# Patient Record
Sex: MALE | Race: WHITE | NOT HISPANIC OR LATINO | Employment: FULL TIME | URBAN - METROPOLITAN AREA
[De-identification: names, ages, dates, MRNs, and addresses within clinical notes are randomized per-mention and may not be internally consistent; named-entity substitution may affect disease eponyms.]

---

## 2022-03-04 ENCOUNTER — OFFICE VISIT (OUTPATIENT)
Dept: FAMILY MEDICINE | Facility: CLINIC | Age: 32
End: 2022-03-04
Payer: COMMERCIAL

## 2022-03-04 VITALS
HEART RATE: 77 BPM | WEIGHT: 221 LBS | DIASTOLIC BLOOD PRESSURE: 78 MMHG | BODY MASS INDEX: 32.73 KG/M2 | SYSTOLIC BLOOD PRESSURE: 130 MMHG | HEIGHT: 69 IN | OXYGEN SATURATION: 98 % | RESPIRATION RATE: 20 BRPM

## 2022-03-04 DIAGNOSIS — G62.9 NEUROPATHY: ICD-10-CM

## 2022-03-04 DIAGNOSIS — F39 MOOD DISORDER: ICD-10-CM

## 2022-03-04 DIAGNOSIS — Z76.89 ENCOUNTER TO ESTABLISH CARE WITH NEW DOCTOR: Primary | ICD-10-CM

## 2022-03-04 DIAGNOSIS — R45.4 DIFFICULTY CONTROLLING ANGER: ICD-10-CM

## 2022-03-04 PROCEDURE — 3008F PR BODY MASS INDEX (BMI) DOCUMENTED: ICD-10-PCS | Mod: CPTII,S$GLB,, | Performed by: NURSE PRACTITIONER

## 2022-03-04 PROCEDURE — 3075F SYST BP GE 130 - 139MM HG: CPT | Mod: CPTII,S$GLB,, | Performed by: NURSE PRACTITIONER

## 2022-03-04 PROCEDURE — 1159F MED LIST DOCD IN RCRD: CPT | Mod: CPTII,S$GLB,, | Performed by: NURSE PRACTITIONER

## 2022-03-04 PROCEDURE — 99204 PR OFFICE/OUTPT VISIT, NEW, LEVL IV, 45-59 MIN: ICD-10-PCS | Mod: S$GLB,,, | Performed by: NURSE PRACTITIONER

## 2022-03-04 PROCEDURE — 1159F PR MEDICATION LIST DOCUMENTED IN MEDICAL RECORD: ICD-10-PCS | Mod: CPTII,S$GLB,, | Performed by: NURSE PRACTITIONER

## 2022-03-04 PROCEDURE — 3078F PR MOST RECENT DIASTOLIC BLOOD PRESSURE < 80 MM HG: ICD-10-PCS | Mod: CPTII,S$GLB,, | Performed by: NURSE PRACTITIONER

## 2022-03-04 PROCEDURE — 3078F DIAST BP <80 MM HG: CPT | Mod: CPTII,S$GLB,, | Performed by: NURSE PRACTITIONER

## 2022-03-04 PROCEDURE — 3008F BODY MASS INDEX DOCD: CPT | Mod: CPTII,S$GLB,, | Performed by: NURSE PRACTITIONER

## 2022-03-04 PROCEDURE — 99204 OFFICE O/P NEW MOD 45 MIN: CPT | Mod: S$GLB,,, | Performed by: NURSE PRACTITIONER

## 2022-03-04 PROCEDURE — 3075F PR MOST RECENT SYSTOLIC BLOOD PRESS GE 130-139MM HG: ICD-10-PCS | Mod: CPTII,S$GLB,, | Performed by: NURSE PRACTITIONER

## 2022-03-04 RX ORDER — MELOXICAM 15 MG/1
15 TABLET ORAL DAILY
COMMUNITY

## 2022-03-04 RX ORDER — TIZANIDINE HYDROCHLORIDE 4 MG/1
CAPSULE, GELATIN COATED ORAL
COMMUNITY

## 2022-03-04 RX ORDER — CARIPRAZINE 1.5 MG/1
1.5 CAPSULE, GELATIN COATED ORAL DAILY
Qty: 30 CAPSULE | Refills: 0
Start: 2022-03-04 | End: 2022-03-18 | Stop reason: SDUPTHER

## 2022-03-04 RX ORDER — ARIPIPRAZOLE 2 MG/1
5 TABLET ORAL DAILY
COMMUNITY
End: 2022-03-18

## 2022-03-04 NOTE — PROGRESS NOTES
"Subjective:      Patient ID: Dustin Garcia is a 31 y.o. male.    Chief Complaint: Establish Care (Prior PCP = TEX GARCIA. Possible carpal tunnel: wrist px, hands go numb)      31-year-old male      Patient is here today to establish primary care      He is currently on Abilify for "ADHD and anger".  He has been off the medication for 2-3 days. Has been on the med for a total of 1 month.  This is given to him by his prior PCP primarily for anger and mood disorder.  He states his PCP told him that he was "probably ADHD as a child and it either goes one or two ways.... good or bad.... as an adult."  He has difficulty controlling his anger with co workers.  He drinks daily. Some days 1-2 drinks. Other days he may drink in excess. Has a family history of alcoholism. He is accompanied by his wife. He denies SI, HI, delusion, grandiosity. + irritability, anxiety. No FOi. Has taken escitalopram and Prozac. He had significant side effects. Abilify "somewhat does what I need it to but sometimes I dont feel like its working."       No other issues reported.     Past Medical History:   Diagnosis Date    Arthritis of back     Mood disorder       Social History     Socioeconomic History    Marital status:    Tobacco Use    Smoking status: Current Every Day Smoker     Packs/day: 1.00     Years: 12.00     Pack years: 12.00     Types: Cigarettes    Smokeless tobacco: Never Used   Substance and Sexual Activity    Alcohol use: Yes    Drug use: Never      Family History   Problem Relation Age of Onset    Diabetes Mother     Cancer Mother     No Known Problems Father     No Known Problems Sister     No Known Problems Brother     No Known Problems Maternal Grandmother     No Known Problems Maternal Grandfather     No Known Problems Paternal Grandmother     No Known Problems Paternal Grandfather         ROS:   Review of Systems   Constitutional: Negative for appetite change, chills and fever.   Eyes: Negative for visual " disturbance.   Respiratory: Negative for cough, chest tightness, shortness of breath and wheezing.    Cardiovascular: Negative for chest pain.   Gastrointestinal: Negative for abdominal pain, diarrhea, nausea and vomiting.   Endocrine: Negative for polydipsia, polyphagia and polyuria.   Genitourinary: Negative for difficulty urinating, dysuria, flank pain and hematuria.   Musculoskeletal: Negative for back pain and neck pain.   Skin: Negative for rash.   Neurological: Negative for dizziness, tremors and headaches.   Hematological: Negative for adenopathy. Does not bruise/bleed easily.   Psychiatric/Behavioral: Positive for agitation. Negative for behavioral problems, confusion, decreased concentration, dysphoric mood, hallucinations, self-injury, sleep disturbance and suicidal ideas. The patient is nervous/anxious. The patient is not hyperactive.      Objective:   Physical Exam  Vitals and nursing note reviewed.   Constitutional:       Appearance: He is well-developed.   Eyes:      General: No scleral icterus.     Pupils: Pupils are equal, round, and reactive to light.   Cardiovascular:      Rate and Rhythm: Normal rate and regular rhythm.   Pulmonary:      Effort: Pulmonary effort is normal.      Breath sounds: Normal breath sounds.   Chest:   Breasts:      Right: No supraclavicular adenopathy.      Left: No supraclavicular adenopathy.       Abdominal:      General: Bowel sounds are normal.      Palpations: Abdomen is soft.   Musculoskeletal:         General: Normal range of motion.      Right elbow: Tenderness present in lateral epicondyle.      Left elbow: Tenderness present in lateral epicondyle.      Cervical back: Normal range of motion and neck supple.   Lymphadenopathy:      Cervical: No cervical adenopathy.      Upper Body:      Right upper body: No supraclavicular adenopathy.      Left upper body: No supraclavicular adenopathy.   Skin:     General: Skin is warm and dry.      Capillary Refill: Capillary  refill takes less than 2 seconds.   Neurological:      Mental Status: He is alert and oriented to person, place, and time.   Psychiatric:         Attention and Perception: Attention and perception normal.         Mood and Affect: Affect normal. Mood is anxious. Mood is not depressed. Affect is not angry or inappropriate.         Speech: Speech normal.         Behavior: Behavior normal. Behavior is cooperative.         Thought Content: Thought content normal.         Cognition and Memory: Cognition and memory normal.         Judgment: Judgment normal.       Assessment:     1. Encounter to establish care with new doctor    2. Difficulty controlling anger    3. Mood disorder    4. Neuropathy      No images are attached to the encounter.   Plan:     Problem List Items Addressed This Visit        Psychiatric    Mood disorder    Current Assessment & Plan     Took Lexapro, prozac, and abilify. Failed lexapro and prozac miserably. Had significant side effects.     He has been on Abilify for the past month and states that its somewhat controlling his mood, but he feels like at times it doesn't help. He has been off the medication for 3-4 days.       I gave him 21 days of Vraylar 1.5 mg. Side effects of the medication were discussed with the patient.  Instructed to notify me if akasthesia  develops and I would call out cogentin. Also informed to RTC immediately if s/s worsened. At that time, I would start him back on Abilify.     RTC in 2 weeks for repeat evaluation.            Relevant Medications    cariprazine (VRAYLAR) 1.5 mg Cap      Other Visit Diagnoses     Encounter to establish care with new doctor    -  Primary    Difficulty controlling anger        Relevant Medications    cariprazine (VRAYLAR) 1.5 mg Cap    Neuropathy        WIll get EMG. If no carpal tunnel, will inject his epicondyles.     Relevant Orders    EMG W/ ULTRASOUND AND NERVE CONDUCTION TEST 2 Extremities        Procedures     No results found for any  previous visit.        No results found in the last 30 days.     All diagnostic data (labs/imaging) was reviewed with the patient and/or family member in the room.  All questions were answered to their liking. The patient and/or family member voiced understanding of all instructions provided. Expectations regarding follow up and treatment plan were voiced and confirmed prior to departure. The patient was given orders/instructions at the end of the visit for reference. They were instructed to notify my office if they have not been contacted for imaging/referrals/labs/results in 1-2 weeks. They voiced understanding of all of the above.     Follow up:     There are no Patient Instructions on file for this visit.     Follow up in about 2 weeks (around 3/18/2022), or if symptoms worsen or fail to improve.

## 2022-03-04 NOTE — ASSESSMENT & PLAN NOTE
Took Lexapro, prozac, and abilify. Failed lexapro and prozac miserably. Had significant side effects.     He has been on Abilify for the past month and states that its somewhat controlling his mood, but he feels like at times it doesn't help. He has been off the medication for 3-4 days.       I gave him 21 days of Vraylar 1.5 mg. Side effects of the medication were discussed with the patient.  Instructed to notify me if akasthesia  develops and I would call out cogentin. Also informed to RTC immediately if s/s worsened. At that time, I would start him back on Abilify.     RTC in 2 weeks for repeat evaluation.

## 2022-03-18 ENCOUNTER — OFFICE VISIT (OUTPATIENT)
Dept: FAMILY MEDICINE | Facility: CLINIC | Age: 32
End: 2022-03-18
Payer: COMMERCIAL

## 2022-03-18 VITALS
OXYGEN SATURATION: 98 % | HEART RATE: 85 BPM | DIASTOLIC BLOOD PRESSURE: 84 MMHG | SYSTOLIC BLOOD PRESSURE: 130 MMHG | RESPIRATION RATE: 18 BRPM

## 2022-03-18 DIAGNOSIS — F39 MOOD DISORDER: Primary | ICD-10-CM

## 2022-03-18 DIAGNOSIS — R45.4 DIFFICULTY CONTROLLING ANGER: ICD-10-CM

## 2022-03-18 PROCEDURE — 3075F PR MOST RECENT SYSTOLIC BLOOD PRESS GE 130-139MM HG: ICD-10-PCS | Mod: CPTII,S$GLB,, | Performed by: NURSE PRACTITIONER

## 2022-03-18 PROCEDURE — 3079F PR MOST RECENT DIASTOLIC BLOOD PRESSURE 80-89 MM HG: ICD-10-PCS | Mod: CPTII,S$GLB,, | Performed by: NURSE PRACTITIONER

## 2022-03-18 PROCEDURE — 99213 PR OFFICE/OUTPT VISIT, EST, LEVL III, 20-29 MIN: ICD-10-PCS | Mod: S$GLB,,, | Performed by: NURSE PRACTITIONER

## 2022-03-18 PROCEDURE — 3079F DIAST BP 80-89 MM HG: CPT | Mod: CPTII,S$GLB,, | Performed by: NURSE PRACTITIONER

## 2022-03-18 PROCEDURE — 99213 OFFICE O/P EST LOW 20 MIN: CPT | Mod: S$GLB,,, | Performed by: NURSE PRACTITIONER

## 2022-03-18 PROCEDURE — 1159F PR MEDICATION LIST DOCUMENTED IN MEDICAL RECORD: ICD-10-PCS | Mod: CPTII,S$GLB,, | Performed by: NURSE PRACTITIONER

## 2022-03-18 PROCEDURE — 3075F SYST BP GE 130 - 139MM HG: CPT | Mod: CPTII,S$GLB,, | Performed by: NURSE PRACTITIONER

## 2022-03-18 PROCEDURE — 1159F MED LIST DOCD IN RCRD: CPT | Mod: CPTII,S$GLB,, | Performed by: NURSE PRACTITIONER

## 2022-03-18 RX ORDER — CARIPRAZINE 1.5 MG/1
1.5 CAPSULE, GELATIN COATED ORAL DAILY
Qty: 30 CAPSULE | Refills: 11 | Status: SHIPPED | OUTPATIENT
Start: 2022-03-18

## 2022-03-18 NOTE — PROGRESS NOTES
"Subjective:      Patient ID: Dustin Garcia is a 31 y.o. male.    Chief Complaint: Follow-up      31-year-old male    PMH bipolar disorder.    Patient is here today for 2 week f/u       During his last encounter, he was given Vraylar 1.5 mg for mood disorder.  He was previously on Abilify for "ADHD and anger".   He had been on the med for a total of 1 month, but felt like it was not working for him.  This is given to him by his prior PCP primarily for anger and mood disorder.  He states his PCP told him that he was "probably ADHD as a child and it either goes one or two ways.... good or bad.... as an adult."  Anger and outburst are better controlled on vraylar. Only side effect is fatigue.   He denies SI, HI, delusion, grandiosity. Denies irritability, anxiety. No FOi. Has taken escitalopram and Prozac. He had significant side effects. Abilify "somewhat does what I need it to but sometimes I dont feel like its working."       No other issues reported.     Past Medical History:   Diagnosis Date    Arthritis of back     Mood disorder       Social History     Socioeconomic History    Marital status:    Tobacco Use    Smoking status: Current Every Day Smoker     Packs/day: 1.00     Years: 12.00     Pack years: 12.00     Types: Cigarettes    Smokeless tobacco: Never Used   Substance and Sexual Activity    Alcohol use: Yes    Drug use: Never      Family History   Problem Relation Age of Onset    Diabetes Mother     Cancer Mother     No Known Problems Father     No Known Problems Sister     No Known Problems Brother     No Known Problems Maternal Grandmother     No Known Problems Maternal Grandfather     No Known Problems Paternal Grandmother     No Known Problems Paternal Grandfather         ROS:   Review of Systems   Constitutional: Negative for appetite change, chills and fever.   Eyes: Negative for visual disturbance.   Respiratory: Negative for cough, chest tightness, shortness of breath and " wheezing.    Cardiovascular: Negative for chest pain.   Gastrointestinal: Negative for abdominal pain, diarrhea, nausea and vomiting.   Endocrine: Negative for polydipsia, polyphagia and polyuria.   Genitourinary: Negative for difficulty urinating, dysuria, flank pain and hematuria.   Musculoskeletal: Negative for back pain and neck pain.   Skin: Negative for rash.   Neurological: Negative for dizziness, tremors and headaches.   Hematological: Negative for adenopathy. Does not bruise/bleed easily.   Psychiatric/Behavioral: Negative for agitation, behavioral problems, confusion, decreased concentration, dysphoric mood, hallucinations, self-injury, sleep disturbance and suicidal ideas. The patient is not nervous/anxious and is not hyperactive.      Objective:   Physical Exam  Vitals and nursing note reviewed.   Constitutional:       Appearance: He is well-developed.   Eyes:      General: No scleral icterus.     Pupils: Pupils are equal, round, and reactive to light.   Cardiovascular:      Rate and Rhythm: Normal rate and regular rhythm.   Pulmonary:      Effort: Pulmonary effort is normal.      Breath sounds: Normal breath sounds.   Chest:   Breasts:      Right: No supraclavicular adenopathy.      Left: No supraclavicular adenopathy.       Abdominal:      General: Bowel sounds are normal.      Palpations: Abdomen is soft.   Musculoskeletal:         General: Normal range of motion.      Right elbow: Tenderness present in lateral epicondyle.      Left elbow: Tenderness present in lateral epicondyle.      Cervical back: Normal range of motion and neck supple.   Lymphadenopathy:      Cervical: No cervical adenopathy.      Upper Body:      Right upper body: No supraclavicular adenopathy.      Left upper body: No supraclavicular adenopathy.   Skin:     General: Skin is warm and dry.      Capillary Refill: Capillary refill takes less than 2 seconds.   Neurological:      Mental Status: He is alert and oriented to person,  place, and time.   Psychiatric:         Attention and Perception: Attention and perception normal.         Mood and Affect: Mood and affect normal. Mood is not anxious or depressed. Affect is not angry or inappropriate.         Speech: Speech normal.         Behavior: Behavior normal. Behavior is cooperative.         Thought Content: Thought content normal.         Cognition and Memory: Cognition and memory normal.         Judgment: Judgment normal.       Assessment:     1. Mood disorder    2. Difficulty controlling anger      No images are attached to the encounter.   Plan:     Problem List Items Addressed This Visit        Psychiatric    Mood disorder - Primary    Current Assessment & Plan        Took Lexapro, prozac, and abilify. Failed lexapro and prozac miserably. Had significant side effects.      He recently failed Abilify.         I gave him 21 days of Vraylar 1.5 mg during his last encounter and he seems to be doing well on this med. Will give him 14 more days and advised to fill RX. RTC in 1 month.       RTC in 4 weeks for repeat evaluation.                 Relevant Medications    cariprazine (VRAYLAR) 1.5 mg Cap      Other Visit Diagnoses     Difficulty controlling anger        Relevant Medications    cariprazine (VRAYLAR) 1.5 mg Cap        Procedures     No results found for any previous visit.        No results found in the last 30 days.     All diagnostic data (labs/imaging) was reviewed with the patient and/or family member in the room.  All questions were answered to their liking. The patient and/or family member voiced understanding of all instructions provided. Expectations regarding follow up and treatment plan were voiced and confirmed prior to departure. The patient was given orders/instructions at the end of the visit for reference. They were instructed to notify my office if they have not been contacted for imaging/referrals/labs/results in 1-2 weeks. They voiced understanding of all of the  above.     Follow up:     There are no Patient Instructions on file for this visit.     Follow up in about 1 month (around 4/18/2022), or if symptoms worsen or fail to improve.

## 2022-03-18 NOTE — ASSESSMENT & PLAN NOTE
Took Lexapro, prozac, and abilify. Failed lexapro and prozac miserably. Had significant side effects.      He recently failed Abilify.         I gave him 21 days of Vraylar 1.5 mg during his last encounter and he seems to be doing well on this med. Will give him 14 more days and advised to fill RX. RTC in 1 month.       RTC in 4 weeks for repeat evaluation.